# Patient Record
Sex: MALE | Race: WHITE
[De-identification: names, ages, dates, MRNs, and addresses within clinical notes are randomized per-mention and may not be internally consistent; named-entity substitution may affect disease eponyms.]

---

## 2018-09-26 ENCOUNTER — HOSPITAL ENCOUNTER (OUTPATIENT)
Dept: HOSPITAL 47 - RADXRMAIN | Age: 33
Discharge: HOME | End: 2018-09-26
Payer: COMMERCIAL

## 2018-09-26 DIAGNOSIS — M25.512: ICD-10-CM

## 2018-09-26 DIAGNOSIS — Z03.89: Primary | ICD-10-CM

## 2018-09-26 PROCEDURE — 70030 X-RAY EYE FOR FOREIGN BODY: CPT

## 2018-09-26 NOTE — XR
EXAMINATION TYPE: XR orbit detect foreign body

 

DATE OF EXAM: 9/26/2018

 

COMPARISON: NONE

 

HISTORY: MRI clearance

 

TECHNIQUE: 3 views

 

FINDINGS: There is no evidence of radiopaque foreign body. Orbital margins are intact. Paranasal sinu
ses appear normal.

 

IMPRESSION: Negative exam

## 2018-09-28 ENCOUNTER — HOSPITAL ENCOUNTER (OUTPATIENT)
Dept: HOSPITAL 47 - RADMRIMAIN | Age: 33
Discharge: HOME | End: 2018-09-28
Payer: COMMERCIAL

## 2018-09-28 DIAGNOSIS — R93.7: Primary | ICD-10-CM

## 2018-09-28 DIAGNOSIS — M25.512: ICD-10-CM

## 2018-09-30 NOTE — MR
EXAMINATION TYPE: MR shoulder LT wo con

 

DATE OF EXAM: 9/28/2018

 

COMPARISON: None

 

HISTORY: Lt shoulder pain/arm numbness

 

TECHNIQUE: Multiplanar, multisequence imaging of the left shoulder is performed without contrast.

 

FINDINGS:

 

Rotator Cuff: Small amount of fluid is within the subacromial bursa. The acromion is downward sloping
 which can contribute to impingement syndrome. No tendon or muscle retraction is evident. Muscle sign
al appears normal couple small subchondral cysts on the posterior humeral head. No definite perforati
on is identified.

 

Acromioclavicular Joint: Hypertrophy. There is downward spurring at the acromioclavicular junction wh
ich can contribute to impingement syndrome.

 

Glenohumeral Joint: Intact.

 

Labrum: There is some increased signal along the superior glenoid labrum is some internal derangement
 or a SLAP lesion could be considered.

 

Biceps Tendon: The long head of biceps is in normal location within bicipital groove.

 

Bone marrow signal: No focal abnormal marrow signal is appreciated.

 

Other: No additional significant abnormality is appreciated.

 

IMPRESSION: 

1. Small amount of fluid in the subacromial bursa. No signal abnormality through the rotator cuff is 
evident suggesting moderate supraspinatus tendinosis most likely within the differential. Very small 
perforation is considered less likely. No complete tear with retraction is evident.

2. Signal abnormality within the superior glenoid labrum may be related to a SLAP lesion or degenerat
cristofer change.

## 2019-07-05 ENCOUNTER — HOSPITAL ENCOUNTER (OUTPATIENT)
Dept: HOSPITAL 47 - LABPAT | Age: 34
Discharge: HOME | End: 2019-07-05
Attending: ORTHOPAEDIC SURGERY
Payer: COMMERCIAL

## 2019-07-05 DIAGNOSIS — M75.42: ICD-10-CM

## 2019-07-05 DIAGNOSIS — Z01.812: Primary | ICD-10-CM

## 2019-07-05 LAB
ANION GAP SERPL CALC-SCNC: 9 MMOL/L
BASOPHILS # BLD AUTO: 0.1 K/UL (ref 0–0.2)
BASOPHILS NFR BLD AUTO: 1 %
CHLORIDE SERPL-SCNC: 102 MMOL/L (ref 98–107)
CO2 SERPL-SCNC: 29 MMOL/L (ref 22–30)
EOSINOPHIL # BLD AUTO: 0.1 K/UL (ref 0–0.7)
EOSINOPHIL NFR BLD AUTO: 2 %
ERYTHROCYTE [DISTWIDTH] IN BLOOD BY AUTOMATED COUNT: 6.03 M/UL (ref 4.3–5.9)
ERYTHROCYTE [DISTWIDTH] IN BLOOD: 15.2 % (ref 11.5–15.5)
HCT VFR BLD AUTO: 53.3 % (ref 39–53)
HGB BLD-MCNC: 17.5 GM/DL (ref 13–17.5)
LYMPHOCYTES # SPEC AUTO: 1.9 K/UL (ref 1–4.8)
LYMPHOCYTES NFR SPEC AUTO: 35 %
MCH RBC QN AUTO: 29 PG (ref 25–35)
MCHC RBC AUTO-ENTMCNC: 32.8 G/DL (ref 31–37)
MCV RBC AUTO: 88.4 FL (ref 80–100)
MONOCYTES # BLD AUTO: 0.4 K/UL (ref 0–1)
MONOCYTES NFR BLD AUTO: 7 %
NEUTROPHILS # BLD AUTO: 2.8 K/UL (ref 1.3–7.7)
NEUTROPHILS NFR BLD AUTO: 52 %
PLATELET # BLD AUTO: 190 K/UL (ref 150–450)
POTASSIUM SERPL-SCNC: 4.7 MMOL/L (ref 3.5–5.1)
SODIUM SERPL-SCNC: 140 MMOL/L (ref 137–145)
WBC # BLD AUTO: 5.3 K/UL (ref 3.8–10.6)

## 2019-07-05 PROCEDURE — 85025 COMPLETE CBC W/AUTO DIFF WBC: CPT

## 2019-07-05 PROCEDURE — 80051 ELECTROLYTE PANEL: CPT

## 2019-07-05 PROCEDURE — 36415 COLL VENOUS BLD VENIPUNCTURE: CPT

## 2019-07-24 NOTE — HP
HISTORY AND PHYSICAL



Surgery is 07/25/2019



Judah Martin is a 34-year-old patient seen with progressive left shoulder pain.  We

discussed options for treatment.  He elected to proceed with left shoulder arthroscopy.

Consent regarding the procedure was obtained.



PAST MEDICAL HISTORY:

Noncontributory.



PAST SURGICAL HISTORY:

Ankle surgery .



DAILY MEDICATIONS:

None.



ALLERGIES:

None reported.



SOCIAL HISTORY:

Denies tobacco use.



PHYSICAL EXAMINATION:

PHYSICAL EVALUATION LEFT SHOULDER: Flexion is 90 degrees. Abduction is 90 degrees.

External rotation is 40 degrees with pain and weakness.  Tenderness along the

anterolateral acromion and rotator cuff insertion site.  Impingement is positive at 90

degrees.  Distal neurovascular exam is intact



Left shoulder radiographs revealed a type 2 anterior acromion, a downsloping anterior

acromion and acromioclavicular joint osteoarthritis.



Left shoulder MRI revealed a downsloping acromion and rotator cuff _____,

acromioclavicular joint osteoarthritis and possible labral tear.



IMPRESSION:

Left shoulder impingement with possible labral tear, possible rotator cuff tear and

acromioclavicular joint osteoarthritis.



PLAN:

Left shoulder arthroscopy with subacromial decompression, possible arthroscopic rotator

cuff repair probable Nickolas procedure and debridement.





MMODL / IJN: [id number] / Job#: [id number]

## 2019-07-25 ENCOUNTER — HOSPITAL ENCOUNTER (OUTPATIENT)
Dept: HOSPITAL 47 - OR | Age: 34
Discharge: HOME | End: 2019-07-25
Attending: ORTHOPAEDIC SURGERY
Payer: COMMERCIAL

## 2019-07-25 VITALS — SYSTOLIC BLOOD PRESSURE: 129 MMHG | HEART RATE: 64 BPM | DIASTOLIC BLOOD PRESSURE: 86 MMHG

## 2019-07-25 VITALS — BODY MASS INDEX: 32.8 KG/M2

## 2019-07-25 VITALS — TEMPERATURE: 97.2 F

## 2019-07-25 VITALS — RESPIRATION RATE: 18 BRPM

## 2019-07-25 DIAGNOSIS — M75.102: Primary | ICD-10-CM

## 2019-07-25 DIAGNOSIS — S43.432A: ICD-10-CM

## 2019-07-25 DIAGNOSIS — M94.212: ICD-10-CM

## 2019-07-25 DIAGNOSIS — M75.42: ICD-10-CM
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
## 2019-07-25 DIAGNOSIS — M25.712: ICD-10-CM

## 2019-07-25 PROCEDURE — 29827 SHO ARTHRS SRG RT8TR CUF RPR: CPT

## 2019-07-25 PROCEDURE — 64415 NJX AA&/STRD BRCH PLXS IMG: CPT

## 2019-07-25 PROCEDURE — 29826 SHO ARTHRS SRG DECOMPRESSION: CPT

## 2019-07-25 NOTE — P.ANPRN
Procedure Note - Anesthesia





- Nerve Block Performed


  ** Left Interscalene Infusion


Time Out Performed: Yes


Date of Procedure: 07/25/19


Procedure Start Time: 06:56


Procedure Stop Time: 07:07


Location of Patient Procedure: PreOp


Indication: Acute Post-Operative Pain, Requested by physician


Sedation Type: Sedate with meaningful contact maintained


Preparation: Sterile Prep, Sterile Dressing


Position: Sitting


Catheter Depth at Skin (cm): 5


Catheter: Indwelling


Needle Types: On-Q


Needle Gauge: 18


Technique: Ultrasound


Injectate: 0.5% Ropivacaine (see comment for volume) (20 ml)

## 2019-07-25 NOTE — P.OP
Date of Procedure: 07/25/19


Preoperative Diagnosis: 


Left shoulder impingement


Postoperative Diagnosis: 


1.  Left shoulder rotator cuff tear


2.  Left shoulder anterior labral tear


3.  Left shoulder impingement


Procedure(s) Performed: 


1.  Left shoulder arthroscopic rotator cuff repair


2.  Left shoulder arthroscopic anterior labral repair


3.  Left shoulder arthroscopic subacromial decompression





Implants: 


14.75mm Arthrex swivel lock anchor


12.9mm Arthrex push lock anchor


Anesthesia: GETA, regional (Interscalene block/catheter)


Surgeon: Rickey Booth


Assistant #1: Dante Correa


Estimated Blood Loss (ml): 10


Pathology: none sent


Condition: stable


Disposition: PACU


Indications for Procedure: 


34-year-old patient seen with progressive left shoulder pain.  After treatment 

options were discussed with him, he elected to proceed with arthroscopy.


Operative Findings: 


See description of procedure


Description of Procedure: 








 Patient underwent an interscalene block/catheter by department of anesthesia 

for postoperative pain management.  The patient was then taken to the operative 

suite.  The patient underwent a general anesthetic by the department of 

anesthesia.  The patient was placed into a lateral position and secured.  There 

was appropriate padding of the bony prominence.  Left shoulder was then prepped 

and draped in normal sterile orthopedic fashion.  We placed the extremity in 10 

pounds of longitudinal traction. A posterior incision was now made for a 

posterior working portal site. The trocar and cannula were inserted into the 

glenohumeral joint. Arthroscopy was initiated. Spinal needle was now inserted 

anteriorly, to ascertain the anterior working portal site.  An incision was now 

made in that area, a trocar was inserted followed by a probe.  There was a 

anterior labral tear present.  There was superficial fraying of the superior 

labrum with no complete tear present.  Biceps tendon was intact.  The 

glenohumeral joint revealed mild early chondromalacia changes.  The posterior 

and inferior labrum were intact.  I now introduced a catheter, anterior portal 

site.  I now debrided that labral tear down to stable tissue.  I now abraded the

anterior glenoid with a motorized bur.  I now passed labral tape through the 

blade of the anterior labrum.  I held the drill guide in position and Osbaldo LIN drill the  hole for our labral anchor insertion.  We now open 

a Arthrex 2.9 mm push lock anchor.  The labral tape was now passed through the 

eyelet.  The eye was now introduced to her pre-punch hole.  I held that eyelet 

in position on the mena LIN tension the sutures and now introduced the push 

lock anchor.  It had good bite and purchase.  The residual suture limbs were 

clipped.  We had a good stable anterior labral repair at this point.  I debrided

the superior labral fraying down to stable tissue.  The labral repair was now 

probed and found to be stable.  Instruments and cannula were now removed.


Utilizing the posterior working portal site, the trocar and cannula were 

inserted into the subacromial space. Arthroscopy initiated. I made an incision 2

fingerbreadths lateral to the acromion. I introduced my trocar followed by my 

ArthroCare ablator. I now began ablating thick subacromial bursal tissue, which 

exposed the undersurface of the anterior acromion.  There was diminished 

subacromial space. There was a very prominent anterior acromion. A motorized bur

was introduced and a subacromial decompression was performed. I also excised 

some osteophytes off the inferior aspect of the distal clavicle. The AC joint 

was visualized and noted to be moderately arthritic.  I did not think enough 

toward a Nickolas procedure.  I now noted some superficial tearing of the rotator

cuff tendon.  Upon probing the area there was a full-thickness perforation along

the distal supraspinatus midbody area.  I used a motorized shaver to get down to

stable tendon tissue.  I used a motorized bur to abrade the footprint.  I now 

passed 2 everted mattress sutures through good bites of rotator cuff tendon.  I 

now punched a hole for anchor insertion into our abraded footprint area.  I now 

chose a Arthrex 4.75 swivel lock anchor.  The suture limbs were passed through 

the eyelet.  The eyelet was introduced into her pre-punch hole.  I held the 

eyelet in position along the mena LIN tension the sutures and introduced our 

anchor.  We noted good purchase of the anchor.  This did compress the tendon 

along the footprint.  All residual suture limbs were now clipped.  We had good 

compression of the tendon along the footprint.  I injected 1 mL Renyte intra-

articular. Instruments now removed from the portal sites. All portal sites were 

approximated with nylon suture. Sterile dressings were applied followed by a 

shoulder immobilizer.  Dante LIN assisted in this complex case.  The 

patient was awakened, transferred to a bed, and taken to recovery in stable 

condition.

## 2019-10-25 ENCOUNTER — HOSPITAL ENCOUNTER (OUTPATIENT)
Dept: HOSPITAL 47 - LABWHC1 | Age: 34
End: 2019-10-25
Attending: INTERNAL MEDICINE
Payer: COMMERCIAL

## 2019-10-25 DIAGNOSIS — K51.20: Primary | ICD-10-CM

## 2019-10-25 LAB
ALBUMIN SERPL-MCNC: 4.3 G/DL (ref 3.8–4.9)
ALBUMIN/GLOB SERPL: 2.05 G/DL (ref 1.6–3.17)
ALP SERPL-CCNC: 55 U/L (ref 41–126)
ALT SERPL-CCNC: 35 U/L (ref 10–49)
ANION GAP SERPL CALC-SCNC: 7.4 MMOL/L (ref 4–12)
AST SERPL-CCNC: 33 U/L (ref 14–35)
BASOPHILS # BLD AUTO: 0 K/UL (ref 0–0.2)
BASOPHILS NFR BLD AUTO: 1 %
BUN SERPL-SCNC: 19 MG/DL (ref 9–27)
BUN/CREAT SERPL: 17.27 RATIO (ref 12–20)
CALCIUM SPEC-MCNC: 9.2 MG/DL (ref 8.7–10.3)
CHLORIDE SERPL-SCNC: 107 MMOL/L (ref 96–109)
CO2 SERPL-SCNC: 25.6 MMOL/L (ref 21.6–31.8)
EOSINOPHIL # BLD AUTO: 0.2 K/UL (ref 0–0.7)
EOSINOPHIL NFR BLD AUTO: 3 %
ERYTHROCYTE [DISTWIDTH] IN BLOOD BY AUTOMATED COUNT: 5.16 M/UL (ref 4.3–5.9)
ERYTHROCYTE [DISTWIDTH] IN BLOOD: 13.4 % (ref 11.5–15.5)
GLOBULIN SER CALC-MCNC: 2.1 G/DL (ref 1.6–3.3)
GLUCOSE SERPL-MCNC: 92 MG/DL (ref 70–110)
HCT VFR BLD AUTO: 47.4 % (ref 39–53)
HGB BLD-MCNC: 16 GM/DL (ref 13–17.5)
LYMPHOCYTES # SPEC AUTO: 2.1 K/UL (ref 1–4.8)
LYMPHOCYTES NFR SPEC AUTO: 32 %
MCH RBC QN AUTO: 31 PG (ref 25–35)
MCHC RBC AUTO-ENTMCNC: 33.7 G/DL (ref 31–37)
MCV RBC AUTO: 91.8 FL (ref 80–100)
MONOCYTES # BLD AUTO: 0.4 K/UL (ref 0–1)
MONOCYTES NFR BLD AUTO: 6 %
NEUTROPHILS # BLD AUTO: 3.7 K/UL (ref 1.3–7.7)
NEUTROPHILS NFR BLD AUTO: 56 %
PLATELET # BLD AUTO: 230 K/UL (ref 150–450)
POTASSIUM SERPL-SCNC: 4.6 MMOL/L (ref 3.5–5.5)
PROT SERPL-MCNC: 6.4 G/DL (ref 6.2–8.2)
SODIUM SERPL-SCNC: 140 MMOL/L (ref 135–145)
WBC # BLD AUTO: 6.6 K/UL (ref 3.8–10.6)

## 2019-10-25 PROCEDURE — 85025 COMPLETE CBC W/AUTO DIFF WBC: CPT

## 2019-10-25 PROCEDURE — 85652 RBC SED RATE AUTOMATED: CPT

## 2019-10-25 PROCEDURE — 36415 COLL VENOUS BLD VENIPUNCTURE: CPT

## 2019-10-25 PROCEDURE — 86140 C-REACTIVE PROTEIN: CPT

## 2019-10-25 PROCEDURE — 82306 VITAMIN D 25 HYDROXY: CPT

## 2019-10-25 PROCEDURE — 80053 COMPREHEN METABOLIC PANEL: CPT

## 2020-02-07 ENCOUNTER — HOSPITAL ENCOUNTER (OUTPATIENT)
Dept: HOSPITAL 47 - LABWHC1 | Age: 35
Discharge: HOME | End: 2020-02-07
Attending: INTERNAL MEDICINE
Payer: COMMERCIAL

## 2020-02-07 DIAGNOSIS — K51.90: Primary | ICD-10-CM

## 2020-02-07 LAB
BASOPHILS # BLD AUTO: 0.1 K/UL (ref 0–0.2)
BASOPHILS NFR BLD AUTO: 1 %
EOSINOPHIL # BLD AUTO: 0.2 K/UL (ref 0–0.7)
EOSINOPHIL NFR BLD AUTO: 3 %
ERYTHROCYTE [DISTWIDTH] IN BLOOD BY AUTOMATED COUNT: 6.06 M/UL (ref 4.3–5.9)
ERYTHROCYTE [DISTWIDTH] IN BLOOD: 13.6 % (ref 11.5–15.5)
HCT VFR BLD AUTO: 50.9 % (ref 39–53)
HGB BLD-MCNC: 16.7 GM/DL (ref 13–17.5)
LYMPHOCYTES # SPEC AUTO: 1.8 K/UL (ref 1–4.8)
LYMPHOCYTES NFR SPEC AUTO: 26 %
MCH RBC QN AUTO: 27.5 PG (ref 25–35)
MCHC RBC AUTO-ENTMCNC: 32.7 G/DL (ref 31–37)
MCV RBC AUTO: 84 FL (ref 80–100)
MONOCYTES # BLD AUTO: 0.5 K/UL (ref 0–1)
MONOCYTES NFR BLD AUTO: 7 %
NEUTROPHILS # BLD AUTO: 4.4 K/UL (ref 1.3–7.7)
NEUTROPHILS NFR BLD AUTO: 61 %
PLATELET # BLD AUTO: 239 K/UL (ref 150–450)
WBC # BLD AUTO: 7.2 K/UL (ref 3.8–10.6)

## 2020-02-07 PROCEDURE — 87340 HEPATITIS B SURFACE AG IA: CPT

## 2020-02-07 PROCEDURE — 85025 COMPLETE CBC W/AUTO DIFF WBC: CPT

## 2020-02-07 PROCEDURE — 85652 RBC SED RATE AUTOMATED: CPT

## 2020-02-07 PROCEDURE — 86480 TB TEST CELL IMMUN MEASURE: CPT

## 2020-02-07 PROCEDURE — 86704 HEP B CORE ANTIBODY TOTAL: CPT

## 2020-02-07 PROCEDURE — 86706 HEP B SURFACE ANTIBODY: CPT

## 2020-02-07 PROCEDURE — 82306 VITAMIN D 25 HYDROXY: CPT

## 2020-02-07 PROCEDURE — 86140 C-REACTIVE PROTEIN: CPT

## 2020-02-07 PROCEDURE — 36415 COLL VENOUS BLD VENIPUNCTURE: CPT

## 2020-02-07 PROCEDURE — 80053 COMPREHEN METABOLIC PANEL: CPT

## 2020-02-08 LAB
ALBUMIN SERPL-MCNC: 4.5 G/DL (ref 3.8–4.9)
ALBUMIN/GLOB SERPL: 1.96 G/DL (ref 1.6–3.17)
ALP SERPL-CCNC: 68 U/L (ref 41–126)
ALT SERPL-CCNC: 44 U/L (ref 10–49)
ANION GAP SERPL CALC-SCNC: 9.1 MMOL/L (ref 4–12)
AST SERPL-CCNC: 42 U/L (ref 14–35)
BUN SERPL-SCNC: 21 MG/DL (ref 9–27)
BUN/CREAT SERPL: 15 RATIO (ref 12–20)
CALCIUM SPEC-MCNC: 9.2 MG/DL (ref 8.7–10.3)
CHLORIDE SERPL-SCNC: 104 MMOL/L (ref 96–109)
CO2 SERPL-SCNC: 26.9 MMOL/L (ref 21.6–31.8)
GLOBULIN SER CALC-MCNC: 2.3 G/DL (ref 1.6–3.3)
GLUCOSE SERPL-MCNC: 93 MG/DL (ref 70–110)
HBV SURFACE AB SERPL IA-ACNC: 3.5 MIU/ML
POTASSIUM SERPL-SCNC: 4.3 MMOL/L (ref 3.5–5.5)
PROT SERPL-MCNC: 6.8 G/DL (ref 6.2–8.2)
SODIUM SERPL-SCNC: 140 MMOL/L (ref 135–145)

## 2020-02-19 ENCOUNTER — HOSPITAL ENCOUNTER (EMERGENCY)
Dept: HOSPITAL 47 - EC | Age: 35
Discharge: HOME | End: 2020-02-19
Payer: COMMERCIAL

## 2020-02-19 VITALS — RESPIRATION RATE: 18 BRPM | TEMPERATURE: 98.6 F

## 2020-02-19 VITALS — HEART RATE: 64 BPM | DIASTOLIC BLOOD PRESSURE: 59 MMHG | SYSTOLIC BLOOD PRESSURE: 104 MMHG

## 2020-02-19 DIAGNOSIS — Z88.8: ICD-10-CM

## 2020-02-19 DIAGNOSIS — R07.89: Primary | ICD-10-CM

## 2020-02-19 LAB
ALBUMIN SERPL-MCNC: 4 G/DL (ref 3.5–5)
ALP SERPL-CCNC: 53 U/L (ref 38–126)
ALT SERPL-CCNC: 42 U/L (ref 4–49)
ANION GAP SERPL CALC-SCNC: 7 MMOL/L
APTT BLD: 25 SEC (ref 22–30)
AST SERPL-CCNC: 43 U/L (ref 17–59)
BASOPHILS # BLD AUTO: 0 K/UL (ref 0–0.2)
BASOPHILS NFR BLD AUTO: 0 %
BUN SERPL-SCNC: 17 MG/DL (ref 9–20)
CALCIUM SPEC-MCNC: 9.1 MG/DL (ref 8.4–10.2)
CHLORIDE SERPL-SCNC: 106 MMOL/L (ref 98–107)
CO2 SERPL-SCNC: 25 MMOL/L (ref 22–30)
D DIMER PPP FEU-MCNC: 0.27 MG/L FEU (ref ?–0.6)
EOSINOPHIL # BLD AUTO: 0.1 K/UL (ref 0–0.7)
EOSINOPHIL NFR BLD AUTO: 2 %
ERYTHROCYTE [DISTWIDTH] IN BLOOD BY AUTOMATED COUNT: 6.06 M/UL (ref 4.3–5.9)
ERYTHROCYTE [DISTWIDTH] IN BLOOD: 14.3 % (ref 11.5–15.5)
GLUCOSE SERPL-MCNC: 108 MG/DL (ref 74–99)
HCT VFR BLD AUTO: 49.9 % (ref 39–53)
HGB BLD-MCNC: 16.2 GM/DL (ref 13–17.5)
INR PPP: 1 (ref ?–1.2)
LYMPHOCYTES # SPEC AUTO: 1.3 K/UL (ref 1–4.8)
LYMPHOCYTES NFR SPEC AUTO: 18 %
MAGNESIUM SPEC-SCNC: 1.6 MG/DL (ref 1.6–2.3)
MCH RBC QN AUTO: 26.7 PG (ref 25–35)
MCHC RBC AUTO-ENTMCNC: 32.4 G/DL (ref 31–37)
MCV RBC AUTO: 82.3 FL (ref 80–100)
MONOCYTES # BLD AUTO: 0.5 K/UL (ref 0–1)
MONOCYTES NFR BLD AUTO: 7 %
NEUTROPHILS # BLD AUTO: 5.2 K/UL (ref 1.3–7.7)
NEUTROPHILS NFR BLD AUTO: 72 %
PLATELET # BLD AUTO: 210 K/UL (ref 150–450)
POTASSIUM SERPL-SCNC: 4.5 MMOL/L (ref 3.5–5.1)
PROT SERPL-MCNC: 7.1 G/DL (ref 6.3–8.2)
PT BLD: 10.2 SEC (ref 9–12)
SODIUM SERPL-SCNC: 138 MMOL/L (ref 137–145)
WBC # BLD AUTO: 7.3 K/UL (ref 3.8–10.6)

## 2020-02-19 PROCEDURE — 36415 COLL VENOUS BLD VENIPUNCTURE: CPT

## 2020-02-19 PROCEDURE — 99285 EMERGENCY DEPT VISIT HI MDM: CPT

## 2020-02-19 PROCEDURE — 85610 PROTHROMBIN TIME: CPT

## 2020-02-19 PROCEDURE — 96375 TX/PRO/DX INJ NEW DRUG ADDON: CPT

## 2020-02-19 PROCEDURE — 96374 THER/PROPH/DIAG INJ IV PUSH: CPT

## 2020-02-19 PROCEDURE — 80053 COMPREHEN METABOLIC PANEL: CPT

## 2020-02-19 PROCEDURE — 83735 ASSAY OF MAGNESIUM: CPT

## 2020-02-19 PROCEDURE — 84484 ASSAY OF TROPONIN QUANT: CPT

## 2020-02-19 PROCEDURE — 85379 FIBRIN DEGRADATION QUANT: CPT

## 2020-02-19 PROCEDURE — 93005 ELECTROCARDIOGRAM TRACING: CPT

## 2020-02-19 PROCEDURE — 85730 THROMBOPLASTIN TIME PARTIAL: CPT

## 2020-02-19 PROCEDURE — 85025 COMPLETE CBC W/AUTO DIFF WBC: CPT

## 2020-02-19 PROCEDURE — 71046 X-RAY EXAM CHEST 2 VIEWS: CPT

## 2020-02-19 NOTE — ED
General Adult HPI





- General


Chief complaint: Chest Pain


Stated complaint: Chest Pain


Time Seen by Provider: 02/19/20 06:45


Source: patient, RN notes reviewed


Mode of arrival: ambulatory


Limitations: no limitations





- History of Present Illness


Initial comments: 





44-year-old male presents to the emergency department for a chief complaint of 

chest pain.  Patient states he has had left-sided chest pain for the past 4 

days.  Patient states he noticed this when he was leaving the gym.  Patient 

states that pain worsens with taking a deep breath.  States that pain gets 

better when he holds pressure over the area on the left side of the chest.  He 

does not feel short of breath but does have pain upon deep inspiration.  Patient

denies cough.  Denies any recent travel.  Denies any swelling or pain in the 

calves or legs.  Denies any history of blood clots.  Patient denies any cardiac 

history.Patient has no other complaints at this time including shortness of 

breath,  abdominal pain, nausea or vomiting, headache, or visual changes.





- Related Data


                                Home Medications











 Medication  Instructions  Recorded  Confirmed


 


Omega-3 Fatty Acids/Fish Oil [Fish 1 cap PO TID 02/19/20 02/19/20





Oil 1,000 mg Softgel]   











                                    Allergies











Allergy/AdvReac Type Severity Reaction Status Date / Time


 


mesalamine Allergy  Rash/Hives Verified 02/19/20 07:59


 


niacin Allergy  Rash/Hives Verified 02/19/20 07:59














Review of Systems


ROS Statement: 


Those systems with pertinent positive or pertinent negative responses have been 

documented in the HPI.





ROS Other: All systems not noted in ROS Statement are negative.





Past Medical History


Past Medical History: Asthma


History of Any Multi-Drug Resistant Organisms: None Reported


Additional Past Surgical History / Comment(s): left shoulder surgery


Past Psychological History: No Psychological Hx Reported


Smoking Status: Never smoker


Past Alcohol Use History: None Reported


Past Drug Use History: None Reported





General Exam


Limitations: no limitations


General appearance: alert, in no apparent distress


Head exam: Present: atraumatic, normocephalic, normal inspection


Eye exam: Present: normal appearance, PERRL, EOMI.  Absent: scleral icterus, 

conjunctival injection, periorbital swelling


ENT exam: Present: normal exam, mucous membranes moist


Neck exam: Present: normal inspection, full ROM.  Absent: tenderness, 

meningismus, lymphadenopathy


Respiratory exam: Present: normal lung sounds bilaterally.  Absent: respiratory 

distress, wheezes, rales, rhonchi, stridor


Cardiovascular Exam: Present: regular rate, normal rhythm, normal heart sounds. 

Absent: systolic murmur, diastolic murmur, rubs, gallop, clicks


GI/Abdominal exam: Present: soft, normal bowel sounds.  Absent: distended, 

tenderness, guarding, rebound, rigid


Neurological exam: Present: alert





Course


                                   Vital Signs











  02/19/20 02/19/20





  06:36 08:00


 


Temperature 98.6 F 


 


Pulse Rate 78 80


 


Respiratory 18 18





Rate  


 


Blood Pressure 149/79 127/67


 


O2 Sat by Pulse 98 





Oximetry  














EKG Findings





- EKG Comments:


EKG Findings:: Normal sinus rhythm, ventricular rate 70, MT interval 126, QTc 

399





Medical Decision Making





- Medical Decision Making





Vitals are stable.  Patient states he has pain when breathing.  This happened 

after he worked out at the gym.  Patient states that pressing on the left 

anterior chest where the pain is improved his pain and allows him to take a deep

breath.  EKG showed a normal sinus rhythm without evidence of ST elevation or 

depression.  CBC CMP unremarkable.  Troponin negative.  D-dimer is within normal

limits.  Chest x-ray shows no acute pulmonary process.  I suspect this is 

secondary to a musculoskeletal cause given improvement when splinting the area. 

Patient was given pain medication which did help significantly.  Patient will 

follow up with primary care.  He will return with any worsening symptoms. 

Discussed case with Dr Puga





- Lab Data


Result diagrams: 


                                 02/19/20 07:22





                                 02/19/20 07:22


                                   Lab Results











  02/19/20 02/19/20 02/19/20 Range/Units





  07:22 07:22 07:22 


 


WBC  7.3    (3.8-10.6)  k/uL


 


RBC  6.06 H    (4.30-5.90)  m/uL


 


Hgb  16.2    (13.0-17.5)  gm/dL


 


Hct  49.9    (39.0-53.0)  %


 


MCV  82.3    (80.0-100.0)  fL


 


MCH  26.7    (25.0-35.0)  pg


 


MCHC  32.4    (31.0-37.0)  g/dL


 


RDW  14.3    (11.5-15.5)  %


 


Plt Count  210    (150-450)  k/uL


 


Neutrophils %  72    %


 


Lymphocytes %  18    %


 


Monocytes %  7    %


 


Eosinophils %  2    %


 


Basophils %  0    %


 


Neutrophils #  5.2    (1.3-7.7)  k/uL


 


Lymphocytes #  1.3    (1.0-4.8)  k/uL


 


Monocytes #  0.5    (0-1.0)  k/uL


 


Eosinophils #  0.1    (0-0.7)  k/uL


 


Basophils #  0.0    (0-0.2)  k/uL


 


PT    10.2  (9.0-12.0)  sec


 


INR    1.0  (<1.2)  


 


APTT    25.0  (22.0-30.0)  sec


 


D-Dimer    0.27  (<0.60)  mg/L FEU


 


Sodium   138   (137-145)  mmol/L


 


Potassium   4.5   (3.5-5.1)  mmol/L


 


Chloride   106   ()  mmol/L


 


Carbon Dioxide   25   (22-30)  mmol/L


 


Anion Gap   7   mmol/L


 


BUN   17   (9-20)  mg/dL


 


Creatinine   1.13   (0.66-1.25)  mg/dL


 


Est GFR (CKD-EPI)AfAm   >90   (>60 ml/min/1.73 sqM)  


 


Est GFR (CKD-EPI)NonAf   84   (>60 ml/min/1.73 sqM)  


 


Glucose   108 H   (74-99)  mg/dL


 


Calcium   9.1   (8.4-10.2)  mg/dL


 


Magnesium   1.6   (1.6-2.3)  mg/dL


 


Total Bilirubin   0.9   (0.2-1.3)  mg/dL


 


AST   43   (17-59)  U/L


 


ALT   42   (4-49)  U/L


 


Alkaline Phosphatase   53   ()  U/L


 


Troponin I     (0.000-0.034)  ng/mL


 


Total Protein   7.1   (6.3-8.2)  g/dL


 


Albumin   4.0   (3.5-5.0)  g/dL














  02/19/20 Range/Units





  07:22 


 


WBC   (3.8-10.6)  k/uL


 


RBC   (4.30-5.90)  m/uL


 


Hgb   (13.0-17.5)  gm/dL


 


Hct   (39.0-53.0)  %


 


MCV   (80.0-100.0)  fL


 


MCH   (25.0-35.0)  pg


 


MCHC   (31.0-37.0)  g/dL


 


RDW   (11.5-15.5)  %


 


Plt Count   (150-450)  k/uL


 


Neutrophils %   %


 


Lymphocytes %   %


 


Monocytes %   %


 


Eosinophils %   %


 


Basophils %   %


 


Neutrophils #   (1.3-7.7)  k/uL


 


Lymphocytes #   (1.0-4.8)  k/uL


 


Monocytes #   (0-1.0)  k/uL


 


Eosinophils #   (0-0.7)  k/uL


 


Basophils #   (0-0.2)  k/uL


 


PT   (9.0-12.0)  sec


 


INR   (<1.2)  


 


APTT   (22.0-30.0)  sec


 


D-Dimer   (<0.60)  mg/L FEU


 


Sodium   (137-145)  mmol/L


 


Potassium   (3.5-5.1)  mmol/L


 


Chloride   ()  mmol/L


 


Carbon Dioxide   (22-30)  mmol/L


 


Anion Gap   mmol/L


 


BUN   (9-20)  mg/dL


 


Creatinine   (0.66-1.25)  mg/dL


 


Est GFR (CKD-EPI)AfAm   (>60 ml/min/1.73 sqM)  


 


Est GFR (CKD-EPI)NonAf   (>60 ml/min/1.73 sqM)  


 


Glucose   (74-99)  mg/dL


 


Calcium   (8.4-10.2)  mg/dL


 


Magnesium   (1.6-2.3)  mg/dL


 


Total Bilirubin   (0.2-1.3)  mg/dL


 


AST   (17-59)  U/L


 


ALT   (4-49)  U/L


 


Alkaline Phosphatase   ()  U/L


 


Troponin I  <0.012  (0.000-0.034)  ng/mL


 


Total Protein   (6.3-8.2)  g/dL


 


Albumin   (3.5-5.0)  g/dL














Disposition


Clinical Impression: 


 Atypical chest pain





Disposition: HOME SELF-CARE


Condition: Good


Instructions (If sedation given, give patient instructions):  Chest Pain (ED), 

Costochondritis (ED)


Additional Instructions: 


Please take Motrin and Tylenol for pain.  Please follow-up with primary care in 

1-2 days.  Return to the emergency Department if you have any worsening 

symptoms.


Is patient prescribed a controlled substance at d/c from ED?: No


Referrals: 


Zainab Crook MD [STAFF PHYSICIAN] - 1-2 days


Time of Disposition: 08:25

## 2020-02-19 NOTE — XR
EXAMINATION TYPE: XR chest 2V

 

DATE OF EXAM: 2/19/2020

 

COMPARISON: None

 

INDICATION: Chest pain on the left rib pain

 

TECHNIQUE:  Frontal and lateral views of the chest are obtained.

 

FINDINGS:  

The heart size is normal.  

The pulmonary vasculature is normal.

The lungs are clear.  No pneumothorax is evident. Osseous structures appear normal

 

IMPRESSION:  

1. No acute pulmonary process.

## 2020-08-31 ENCOUNTER — HOSPITAL ENCOUNTER (OUTPATIENT)
Dept: HOSPITAL 47 - LABWHC1 | Age: 35
Discharge: HOME | End: 2020-08-31
Attending: INTERNAL MEDICINE
Payer: COMMERCIAL

## 2020-08-31 DIAGNOSIS — K51.90: Primary | ICD-10-CM

## 2020-08-31 PROCEDURE — 82542 COL CHROMOTOGRAPHY QUAL/QUAN: CPT

## 2020-08-31 PROCEDURE — 82657 ENZYME CELL ACTIVITY: CPT

## 2020-08-31 PROCEDURE — 36415 COLL VENOUS BLD VENIPUNCTURE: CPT

## 2022-06-10 ENCOUNTER — HOSPITAL ENCOUNTER (OUTPATIENT)
Dept: HOSPITAL 47 - ORWHC2ENDO | Age: 37
Discharge: HOME | End: 2022-06-10
Attending: INTERNAL MEDICINE
Payer: COMMERCIAL

## 2022-06-10 VITALS — DIASTOLIC BLOOD PRESSURE: 70 MMHG | RESPIRATION RATE: 20 BRPM | SYSTOLIC BLOOD PRESSURE: 133 MMHG

## 2022-06-10 VITALS — BODY MASS INDEX: 30.9 KG/M2

## 2022-06-10 VITALS — HEART RATE: 64 BPM

## 2022-06-10 VITALS — TEMPERATURE: 97.7 F

## 2022-06-10 DIAGNOSIS — Z79.890: ICD-10-CM

## 2022-06-10 DIAGNOSIS — K64.8: ICD-10-CM

## 2022-06-10 DIAGNOSIS — Z88.8: ICD-10-CM

## 2022-06-10 DIAGNOSIS — Z79.1: ICD-10-CM

## 2022-06-10 DIAGNOSIS — K51.90: Primary | ICD-10-CM

## 2022-06-10 PROCEDURE — 88305 TISSUE EXAM BY PATHOLOGIST: CPT

## 2022-06-10 PROCEDURE — 45380 COLONOSCOPY AND BIOPSY: CPT

## 2022-06-10 NOTE — P.PCN
Date of Procedure: 06/10/22


Procedure(s) Performed: 


BRIEF HISTORY: Patient is a 37-year-old pleasant white male scheduled for an 

elective colonoscopy as a part of long-standing history of ulcerative colitis 

diagnosed in 2013.  He is currently maintained on Humira every 2 weeks 

centimeters in clinical remission.





PROCEDURE PERFORMED: Colonoscopy with random biopsy. 





PREOPERATIVE DIAGNOSIS: Normal-appearing history of ulcerative colitis diagnosed

in 2013. 





IV sedation per Anesthesia. 





PROCEDURE: After informed consent was obtained, the patient, was brought into 

the endoscopy unit. IV sedation was administered by Anesthesia under continuous 

monitoring.  Digital rectal examination was normal. Initially the Olympus CF-160

flexible video colonoscope was then inserted in the rectum, gradually advanced 

into the cecum without any difficulty. Careful examination was performed as the 

scope was gradually being withdrawn. Ileocecal valve and the appendiceal orifice

were visualized and appeared normal.  Prep was excellent. Mucosa of the cecum, 

ascending colon, transverse colon, descending colon, sigmoid colon, and rectum 

appeared normal.  Biopsies were done from the cecum to rule out dysplasia.


Retroflexion was performed in the rectum and all internal hemorrhoids were seen.

The patient tolerated the procedure well. 





IMPRESSION: 


Normal-appearing colon from rectum to cecum with no evidence of active colitis 

or colorectal neoplasia .


Small internal hemorrhoids.





RECOMMENDATIONS:  Findings of this examination were discussed with the patient 

as well as his family.  He was advised to follow with the biopsy results.  If 

there is evidence of dysplasia he can have a repeat colonoscopy in 2 years..

## 2022-07-25 ENCOUNTER — HOSPITAL ENCOUNTER (OUTPATIENT)
Dept: HOSPITAL 47 - LABPAT | Age: 37
Discharge: HOME | End: 2022-07-25
Attending: ORTHOPAEDIC SURGERY
Payer: COMMERCIAL

## 2022-07-25 DIAGNOSIS — Z01.812: Primary | ICD-10-CM

## 2022-07-25 DIAGNOSIS — G56.01: ICD-10-CM

## 2022-07-25 LAB
ANION GAP SERPL CALC-SCNC: 11.4 MMOL/L (ref 10–18)
BASOPHILS # BLD AUTO: 0.04 X 10*3/UL (ref 0–0.1)
BASOPHILS NFR BLD AUTO: 0.6 %
CHLORIDE SERPL-SCNC: 101 MMOL/L (ref 96–109)
CO2 SERPL-SCNC: 25 MMOL/L (ref 20–27.5)
EOSINOPHIL # BLD AUTO: 0.21 X 10*3/UL (ref 0.04–0.35)
EOSINOPHIL NFR BLD AUTO: 3.2 %
ERYTHROCYTE [DISTWIDTH] IN BLOOD BY AUTOMATED COUNT: 5.84 X 10*6/UL (ref 4.4–5.6)
ERYTHROCYTE [DISTWIDTH] IN BLOOD: 12.6 % (ref 11.5–14.5)
HCT VFR BLD AUTO: 51.3 % (ref 39.6–50)
HGB BLD-MCNC: 17.5 G/DL (ref 13–17)
IMM GRANULOCYTES BLD QL AUTO: 0.5 %
LYMPHOCYTES # SPEC AUTO: 2.15 X 10*3/UL (ref 0.9–5)
LYMPHOCYTES NFR SPEC AUTO: 33 %
MCH RBC QN AUTO: 30 PG (ref 27–32)
MCHC RBC AUTO-ENTMCNC: 34.1 G/DL (ref 32–37)
MCV RBC AUTO: 87.8 FL (ref 80–97)
MONOCYTES # BLD AUTO: 0.61 X 10*3/UL (ref 0.2–1)
MONOCYTES NFR BLD AUTO: 9.4 %
NEUTROPHILS # BLD AUTO: 3.48 X 10*3/UL (ref 1.8–7.7)
NEUTROPHILS NFR BLD AUTO: 53.3 %
NRBC BLD AUTO-RTO: 0 /100 WBCS (ref 0–0)
PLATELET # BLD AUTO: 214 X 10*3/UL (ref 140–440)
POTASSIUM SERPL-SCNC: 4.1 MMOL/L (ref 3.5–5.5)
SODIUM SERPL-SCNC: 138 MMOL/L (ref 135–145)
WBC # BLD AUTO: 6.52 X 10*3/UL (ref 4.5–10)

## 2022-07-25 PROCEDURE — 85025 COMPLETE CBC W/AUTO DIFF WBC: CPT

## 2022-07-25 PROCEDURE — 80051 ELECTROLYTE PANEL: CPT

## 2022-07-25 NOTE — P.HPOR
History of Present Illness


H&P Date: 07/25/22


Chief Complaint: Right carpal tunnel syndrome


ubjective:


This is a 37 year old male that presents today for initial evaluation regarding 

a several year history of worsening thumb index and middle finger paresthesias. 

He works as a  and states his right hand is worse than the left. He has 

symptoms that wake him from sleep at night. He has tried night splinting which 

no longer provides relief. He also has left lateral elbow pain that radiates eladio

n the dorsal forearm that is worse with wrist extension. He denies any injury or

inciting event and likes to power lift. 





Physical Examination:





RUE: AIN/PIN/Radial/Ulnar/Median motor intact. Radial/Ulnar/Median SILT. 2+/4 

Radial/Ulnar pulses palpated. 5/5 APB, 5/5 FDI. Negative Finkelsteins, negative 

CMC grind, positive Durkan's compression. 





LUE: AIN/PIN/Radial/Ulnar/Median motor intact. Radial/Ulnar/Median SILT. 2+/4 

Radial/Ulnar pulses palpated. 5/5 APB, 5/5 FDI. Negative Finkelsteins, negative 

CMC grind, positive Durkan's compression. TTP over ECRB and lateral epicondyle. 

Pain with resisted wrist extension at lateral epicondyle. Elbow ROM 0-130. 

Stable to varus/valgus stress. 





Imaging:


X-Rays of the left elbow demonstrate no acute osseous abnormality. 





Impression:


1.) B/L Carpal tunnel syndrome


2.) Left lateral epicondylitis








Plan:





Diagnosis and treatment options were discussed with the patient. He as failed 

conservative treatment for his right carpal tunnel syndrome and would like to 

proceed with surgical intervention. Risks and benefits of surgery including 

bleeding, infection, damage to surrounding tissue, need for further surgery, 

possible need to convert to open procedure, residual numbness were discussed and

the patient wished to go forward with surgery. In regard to his left lateral 

epicondylitis I recommend a wrist splint in combination with a steroid injection

and he his agreeable. Written procedural consent was obtained prior to inject

ion. A 2cc mixture of 1% Lidocaine and Depomedrol was injected into the area of 

the lateral epicondyle near the ECRB origin without complication. Band-Aid was 

placed and the patient tolerated the procedure well. He will be scheduled for a 

right endoscopic vs open carpal tunnel release in the near future.








-Jessee Kirby DO


Orthopedic Hand/Upper Extremity Surgeon








Past Medical History


Past Medical History: Asthma


Additional Past Medical History / Comment(s): ULCERATIVE COLITIS


History of Any Multi-Drug Resistant Organisms: None Reported


Past Surgical History: Orthopedic Surgery


Additional Past Surgical History / Comment(s): left shoulder surgery.  ORIF LT 

ANKLE.  COLONOSCOPY


Past Anesthesia/Blood Transfusion Reactions: No Reported Reaction


Smoking Status: Never smoker





- Past Family History


  ** Mother


Family Medical History: No Reported History





Medications and Allergies


                                Home Medications











 Medication  Instructions  Recorded  Confirmed  Type


 


Adalimumab [Humira Pen 40 mg SQ Q14D 06/08/22 06/10/22 History





Crohn's-Uc-Hs]    


 


Lisdexamfetamine Dimesylate 50 mg PO QAM 06/08/22 06/10/22 History





[Vyvanse]    


 


Testosterone Cypionate 200 mg IM Q14D 06/08/22 06/10/22 History





[Depo-Testosterone]    








                                    Allergies











Allergy/AdvReac Type Severity Reaction Status Date / Time


 


mesalamine Allergy  Rash/Hives Verified 06/10/22 08:49


 


niacin Allergy  Rash/Hives Verified 06/10/22 08:49














Physical Examination


Osteopathic Statement: *.  No significant issues noted on an osteopathic 

structural exam other than those noted in the History and Physical/Consult.

## 2022-07-27 ENCOUNTER — HOSPITAL ENCOUNTER (OUTPATIENT)
Dept: HOSPITAL 47 - OR | Age: 37
Discharge: HOME | End: 2022-07-27
Attending: ORTHOPAEDIC SURGERY
Payer: COMMERCIAL

## 2022-07-27 VITALS — HEART RATE: 74 BPM | RESPIRATION RATE: 14 BRPM | DIASTOLIC BLOOD PRESSURE: 66 MMHG | SYSTOLIC BLOOD PRESSURE: 115 MMHG

## 2022-07-27 VITALS — TEMPERATURE: 97.9 F

## 2022-07-27 VITALS — BODY MASS INDEX: 31.1 KG/M2

## 2022-07-27 DIAGNOSIS — K51.90: ICD-10-CM

## 2022-07-27 DIAGNOSIS — J45.909: ICD-10-CM

## 2022-07-27 DIAGNOSIS — Z79.1: ICD-10-CM

## 2022-07-27 DIAGNOSIS — M77.12: ICD-10-CM

## 2022-07-27 DIAGNOSIS — G56.01: Primary | ICD-10-CM

## 2022-07-27 DIAGNOSIS — Z79.899: ICD-10-CM

## 2022-07-27 DIAGNOSIS — Z88.8: ICD-10-CM

## 2022-07-27 DIAGNOSIS — Z98.890: ICD-10-CM

## 2022-07-27 DIAGNOSIS — Z79.890: ICD-10-CM

## 2022-07-27 PROCEDURE — 29848 WRIST ENDOSCOPY/SURGERY: CPT

## 2022-07-27 NOTE — P.OP
Date of Procedure: 07/27/22


Preoperative Diagnosis: 


Right carpal tunnel syndrome


Postoperative Diagnosis: 


Right carpal tunnel syndrome


Procedure(s) Performed: 


Right endoscopic carpal tunnel release


Anesthesia: MAC


Surgeon: Jessee Kirby


Assistant #1: Charbel Mesa


Estimated Blood Loss (ml): 0


Pathology: none sent


Condition: stable


Disposition: PACU


Description of Procedure: 


This is a 37 year old male who presents today for a right endoscopic carpal 

tunnel release after having failed conservative treatment in the past. Risks and

benefits of surgery were discussed with the patient including bleeding, damage 

to surrounding tissue, infection, need to convert to open procedure, need for 

further surgery as well as risks of anesthesia including pulmonary embolism and 

even death and the patient wished to proceed with surgical intervention. The 

patients was seen in the pre-operative area by myself. Consent and H&P were 

completed and updated. The correct extremity was marked in the pre-operative 

area by myself and all other questions were answered. 





Operative Narrative:


   The patient was brought to the operating room by the department of 

anesthesia. They remained on the portable stretcher and a rolling hand table was

brought to the side of the operative extremity. Pre-operative time out was 

performed indicating the correct patient, procedure and laterality. All in the 

room agreed. The patient was then drifted off to sleep by the department of anes

thesia. MAC anesthesia was utilized and a 50:50 mixture of 1% Lidocaine and 0.5%

bupivacaine was injected into the subcutaneous tissues of the palmar skin, 6ccs

total. A nonsterile tourniquet was then applied to the operative extremity and 

the right upper extremity was then prepped and draped in normal sterile fashion.

 The operative extremity was the exsanguinated with an esmarch bandage and the 

tourniquet was inflated to 250mmHg.  








 


15 blade scalpel was utilized to make a transverse incision on the palmar skin 

just ulnar to the palmaris longus tendon at the level of the distal wrist crea

se. Ragnell retractor was then placed radially and blunt dissection was 

performed to reveal the distal forearm fascia. This was lifted with fine Surjit 

pick ups and Littler tenotomy scissors were then used to open the forearm 

fascia transversely and a double skin hook was then placed. Hamate finder was 

placed into the carpal tunnel and then sequential sized dilators were inserted 

followed by the synovial elevator to separate the flexor tenosynovium from the 

undersurface of the transverse carpal ligament and a washboard texture was felt.

The [employer] endoscopic carpal tunnel release system gun was the then inserted 

into the carpal tunnel hugging the deep portion of the transverse carpal 

ligament in line with the base of the ring finger. Transverse fibers of the 

ligament were directly visualized. Pressure was applied on the palm to reveal 

the distal extent of the transverse carpal ligament. The blade was then deployed

and the distal half of the transverse carpal ligament was released. The scope 

was then brought distal again and remaining transverse fibers were incised with 

the blade. The proximal half of the transverse carpal ligament was then divided 

and again the scope was advanced distal and remaining transverse fibers were 

incised with the blade. The radial and ulnar leaflets were directly visualized 

and mobile consistent with complete release. Tenotomy scissors were then u

tilized to release the remaining distal forearm fascia under direct 

visualization taking care to preserve the palmar cutaneous branch of the median 

nerve. Skin closure was performed with interrupted 4-0 Monocryl suture followed 

by Mastisol and steri strips. Sterile dressing was applied consisting of 

adaptic, 4x4s, Webril, and an ace bandage. Tourniquet was let down and the hand 

immediately was well perfused. The patient was then woken by the department of 

anesthesia and transferred to PACU in stable condition. Charbel LIN was 

present for the case in its entirety and assisted in major portions of the case 

and protection of vital neurovascular structures. 








Jessee Kirby D.O.


Orthopedic Hand/Upper Extremity Surgeon

## 2025-05-05 ENCOUNTER — HOSPITAL ENCOUNTER (EMERGENCY)
Dept: HOSPITAL 47 - EC | Age: 40
Discharge: HOME | End: 2025-05-05
Payer: COMMERCIAL

## 2025-05-05 VITALS — DIASTOLIC BLOOD PRESSURE: 84 MMHG | SYSTOLIC BLOOD PRESSURE: 134 MMHG | RESPIRATION RATE: 20 BRPM | HEART RATE: 84 BPM

## 2025-05-05 VITALS — TEMPERATURE: 97.8 F

## 2025-05-05 DIAGNOSIS — Z88.3: ICD-10-CM

## 2025-05-05 DIAGNOSIS — Z88.8: ICD-10-CM

## 2025-05-05 DIAGNOSIS — N45.1: Primary | ICD-10-CM

## 2025-05-05 LAB
ALBUMIN SERPL-MCNC: 4.3 G/DL (ref 3.5–5)
ALP SERPL-CCNC: 53 U/L (ref 38–126)
ALT SERPL-CCNC: 70 U/L (ref 4–49)
ANION GAP SERPL CALC-SCNC: 7 MMOL/L
APTT BLD: 24.9 SEC (ref 22–30)
AST SERPL-CCNC: 59 U/L (ref 17–59)
BASOPHILS # BLD AUTO: 0.05 10*3/UL (ref 0–0.1)
BASOPHILS NFR BLD AUTO: 0.7 %
BUN SERPL-SCNC: 23 MG/DL (ref 9–20)
CALCIUM SPEC-MCNC: 9.3 MG/DL (ref 8.4–10.2)
CHLORIDE SERPL-SCNC: 103 MMOL/L (ref 98–107)
CO2 SERPL-SCNC: 27 MMOL/L (ref 22–30)
EOSINOPHIL # BLD AUTO: 0.11 10*3/UL (ref 0.04–0.35)
EOSINOPHIL NFR BLD AUTO: 1.5 %
ERYTHROCYTE [DISTWIDTH] IN BLOOD BY AUTOMATED COUNT: 6.03 10*6/UL (ref 4.4–5.6)
GLUCOSE SERPL-MCNC: 102 MG/DL (ref 74–99)
HCT VFR BLD AUTO: 52.1 % (ref 39.6–50)
HGB BLD-MCNC: 18.9 G/DL (ref 13–17)
INR PPP: 1.1 (ref ?–1.2)
LYMPHOCYTES # SPEC AUTO: 2.02 10*3/UL (ref 0.9–5)
LYMPHOCYTES NFR SPEC AUTO: 27.7 %
MCH RBC QN AUTO: 31.3 PG (ref 27–32)
MCHC RBC AUTO-ENTMCNC: 36.3 G/DL (ref 32–37)
MCV RBC AUTO: 86.4 FL (ref 80–97)
MONOCYTES # BLD AUTO: 0.66 10*3/UL (ref 0.2–1)
MONOCYTES NFR BLD AUTO: 9.1 %
NEUTROPHILS # BLD AUTO: 4.43 10*3/UL (ref 1.8–7.7)
NEUTROPHILS NFR BLD AUTO: 60.7 %
PLATELET # BLD AUTO: 230 10*3/UL (ref 140–440)
POTASSIUM SERPL-SCNC: 4.3 MMOL/L (ref 3.5–5.1)
PROT SERPL-MCNC: 7.2 G/DL (ref 6.3–8.2)
PT BLD: 11.8 SEC (ref 10–12.5)
SODIUM SERPL-SCNC: 137 MMOL/L (ref 137–145)
SP GR UR: 1.02 (ref 1–1.03)
UROBILINOGEN UR QL STRIP: <2 MG/DL (ref ?–2)
WBC # BLD AUTO: 7.29 10*3/UL (ref 4.5–10)

## 2025-05-05 PROCEDURE — 96375 TX/PRO/DX INJ NEW DRUG ADDON: CPT

## 2025-05-05 PROCEDURE — 76870 US EXAM SCROTUM: CPT

## 2025-05-05 PROCEDURE — 83605 ASSAY OF LACTIC ACID: CPT

## 2025-05-05 PROCEDURE — 96376 TX/PRO/DX INJ SAME DRUG ADON: CPT

## 2025-05-05 PROCEDURE — 80053 COMPREHEN METABOLIC PANEL: CPT

## 2025-05-05 PROCEDURE — 99284 EMERGENCY DEPT VISIT MOD MDM: CPT

## 2025-05-05 PROCEDURE — 36415 COLL VENOUS BLD VENIPUNCTURE: CPT

## 2025-05-05 PROCEDURE — 85730 THROMBOPLASTIN TIME PARTIAL: CPT

## 2025-05-05 PROCEDURE — 85610 PROTHROMBIN TIME: CPT

## 2025-05-05 PROCEDURE — 93975 VASCULAR STUDY: CPT

## 2025-05-05 PROCEDURE — 74176 CT ABD & PELVIS W/O CONTRAST: CPT

## 2025-05-05 PROCEDURE — 85025 COMPLETE CBC W/AUTO DIFF WBC: CPT

## 2025-05-05 PROCEDURE — 81003 URINALYSIS AUTO W/O SCOPE: CPT

## 2025-05-05 PROCEDURE — 96374 THER/PROPH/DIAG INJ IV PUSH: CPT

## 2025-05-05 PROCEDURE — 96361 HYDRATE IV INFUSION ADD-ON: CPT

## 2025-05-05 RX ADMIN — CEFAZOLIN ONE MLS/HR: 330 INJECTION, POWDER, FOR SOLUTION INTRAMUSCULAR; INTRAVENOUS at 10:31

## 2025-05-05 RX ADMIN — MORPHINE SULFATE STA MG: 4 INJECTION, SOLUTION INTRAMUSCULAR; INTRAVENOUS at 13:06

## 2025-05-05 RX ADMIN — MORPHINE SULFATE STA MG: 4 INJECTION, SOLUTION INTRAMUSCULAR; INTRAVENOUS at 10:30

## 2025-05-05 RX ADMIN — ONDANSETRON STA MG: 2 INJECTION INTRAMUSCULAR; INTRAVENOUS at 10:30

## 2025-05-05 RX ADMIN — ACETAMINOPHEN AND CODEINE PHOSPHATE STA EACH: 300; 30 TABLET ORAL at 13:39

## 2025-05-05 RX ADMIN — CEFTRIAXONE SODIUM STA MG: 1 INJECTION, POWDER, FOR SOLUTION INTRAMUSCULAR; INTRAVENOUS at 13:39
